# Patient Record
Sex: MALE | Race: WHITE | NOT HISPANIC OR LATINO | Employment: FULL TIME | URBAN - METROPOLITAN AREA
[De-identification: names, ages, dates, MRNs, and addresses within clinical notes are randomized per-mention and may not be internally consistent; named-entity substitution may affect disease eponyms.]

---

## 2020-11-23 ENCOUNTER — HOSPITAL ENCOUNTER (EMERGENCY)
Facility: HOSPITAL | Age: 50
Discharge: HOME | End: 2020-11-23
Attending: EMERGENCY MEDICINE
Payer: COMMERCIAL

## 2020-11-23 VITALS
SYSTOLIC BLOOD PRESSURE: 154 MMHG | WEIGHT: 315 LBS | BODY MASS INDEX: 42.66 KG/M2 | HEART RATE: 81 BPM | TEMPERATURE: 98 F | RESPIRATION RATE: 18 BRPM | DIASTOLIC BLOOD PRESSURE: 98 MMHG | OXYGEN SATURATION: 99 % | HEIGHT: 72 IN

## 2020-11-23 DIAGNOSIS — M25.572 ACUTE LEFT ANKLE PAIN: ICD-10-CM

## 2020-11-23 DIAGNOSIS — M10.9 ACUTE GOUT OF LEFT ANKLE, UNSPECIFIED CAUSE: Primary | ICD-10-CM

## 2020-11-23 LAB — URATE SERPL-MCNC: 5.4 MG/DL (ref 3.4–7)

## 2020-11-23 PROCEDURE — 25000003 PHARM REV CODE 250: Mod: ER | Performed by: EMERGENCY MEDICINE

## 2020-11-23 PROCEDURE — 63600175 PHARM REV CODE 636 W HCPCS: Mod: ER | Performed by: EMERGENCY MEDICINE

## 2020-11-23 PROCEDURE — 84550 ASSAY OF BLOOD/URIC ACID: CPT | Mod: ER

## 2020-11-23 PROCEDURE — 99284 EMERGENCY DEPT VISIT MOD MDM: CPT | Mod: 25,ER

## 2020-11-23 PROCEDURE — 96372 THER/PROPH/DIAG INJ SC/IM: CPT | Mod: ER

## 2020-11-23 RX ORDER — LOSARTAN POTASSIUM 100 MG/1
100 TABLET ORAL
COMMUNITY

## 2020-11-23 RX ORDER — INDOMETHACIN 50 MG/1
50 CAPSULE ORAL 3 TIMES DAILY PRN
Qty: 15 CAPSULE | Refills: 0 | Status: SHIPPED | OUTPATIENT
Start: 2020-11-23 | End: 2020-11-30

## 2020-11-23 RX ORDER — BETAMETHASONE SODIUM PHOSPHATE AND BETAMETHASONE ACETATE 3; 3 MG/ML; MG/ML
6 INJECTION, SUSPENSION INTRA-ARTICULAR; INTRALESIONAL; INTRAMUSCULAR; SOFT TISSUE
Status: COMPLETED | OUTPATIENT
Start: 2020-11-23 | End: 2020-11-23

## 2020-11-23 RX ORDER — PREDNISONE 10 MG/1
10 TABLET ORAL DAILY
Qty: 21 TABLET | Refills: 0 | Status: SHIPPED | OUTPATIENT
Start: 2020-11-23

## 2020-11-23 RX ORDER — IBUPROFEN 200 MG
600 TABLET ORAL
Status: COMPLETED | OUTPATIENT
Start: 2020-11-23 | End: 2020-11-23

## 2020-11-23 RX ADMIN — BETAMETHASONE SODIUM PHOSPHATE AND BETAMETHASONE ACETATE 6 MG: 3; 3 INJECTION, SUSPENSION INTRA-ARTICULAR; INTRALESIONAL; INTRAMUSCULAR at 06:11

## 2020-11-23 RX ADMIN — IBUPROFEN 600 MG: 200 TABLET, FILM COATED ORAL at 05:11

## 2020-11-23 NOTE — Clinical Note
"Wei Murphy" Jeff was seen and treated in our emergency department on 11/23/2020.  He may return to work on 11/26/2020.       If you have any questions or concerns, please don't hesitate to call.      Glen Patel RN RN    "

## 2020-11-23 NOTE — ED NOTES
Pt c/o left ankle and foot pain since yesterday afternoon, pain 10/10 throbbing and constant, is painful to bear weight. Pt states no incident occurred to cause injury.      LOC: The patient is awake, alert and aware of environment with an appropriate affect, the patient is oriented x 3 and speaking appropriately.  APPEARANCE: Patient resting comfortably and in no acute distress, patient is clean and well groomed, patient's clothing is properly fastened.  SKIN: The skin is warm and dry, color consistent with ethnicity, patient has normal skin turgor and moist mucus membranes, skin intact, no breakdown or bruising noted.  MUSCULOSKELETAL: Patient moving all extremities well, no obvious swelling or deformities noted.  RESPIRATORY: Airway is open and patent, respirations are spontaneous, patient has a normal effort and rate, no accessory muscle use noted.  CARDIAC: Patient has a normal rate and rhythm, no peripheral edema noted, capillary refill < 3 seconds.  ABDOMEN: Soft and non tender to palpation, no distention noted.  NEUROLOGIC: PERRL, eyes open spontaneously, behavior appropriate to situation, follows commands, facial expression symmetrical, bilateral hand grasp equal and even, purposeful motor response noted, normal sensation in all extremities when touched with a finger.

## 2020-11-23 NOTE — ED NOTES
Pt given pain medication, VSS, Resp e/u. Stretcher locked in lowest position. Side rails up x2. Call bell within reach. Will continue to monitor.       Radiology at bedside.

## 2020-11-23 NOTE — ED PROVIDER NOTES
Encounter Date: 11/23/2020       History     Chief Complaint   Patient presents with    Ankle Pain     Left ankle and foot pain, no inciddent occurred. Started noon yesterday, hardly able to bear weight.     Chief complaint:  Left ankle pain    History of present; 50-year-old male with complaints of left lateral ankle pain.  Onset acutely yesterday at noon.  He denies any specific trauma to the ankle.  There was an abrupt onset of throbbing discomfort.  10/10 at worst.  States pain radiates down the proximal aspect of the left foot.  No complaints of redness.  Does complain of swelling.  Pain aggravated by weight-bearing significantly.  Alleviated with rest and elevation.  Denies any fever or chills.  No complaints of arthropathy.  Patient with a history of renal cancer with subsequent nephrectomy.  Do for PET scan in next month.        Review of patient's allergies indicates:  No Known Allergies  History reviewed. No pertinent past medical history.  Past Surgical History:   Procedure Laterality Date    KNEE SURGERY Bilateral     NEPHRECTOMY       History reviewed. No pertinent family history.  Social History     Tobacco Use    Smoking status: Current Some Day Smoker     Years: 20.00     Types: Cigarettes    Smokeless tobacco: Never Used   Substance Use Topics    Alcohol use: Never     Frequency: Never    Drug use: Never     Review of Systems   Constitutional: Negative.  Negative for activity change and fever.   HENT: Negative.    Eyes: Negative.    Respiratory: Negative.    Cardiovascular: Negative.    Gastrointestinal: Negative for nausea and vomiting.   Genitourinary: Negative.  Negative for flank pain.   Musculoskeletal: Positive for arthralgias (left ankle), gait problem (Secondary to ankle pain) and joint swelling (Lateral aspect left ankle). Negative for back pain, myalgias, neck pain and neck stiffness.   Neurological: Negative for dizziness, weakness and numbness.   Hematological: Does not  bruise/bleed easily.   Psychiatric/Behavioral: Negative.    All other systems reviewed and are negative.      Physical Exam     Initial Vitals [11/23/20 0530]   BP Pulse Resp Temp SpO2   (!) 154/98 81 18 97.9 °F (36.6 °C) 99 %      MAP       --         Physical Exam    Nursing note and vitals reviewed.  Constitutional: He appears well-nourished. No distress.   HENT:   Head: Normocephalic and atraumatic.   Right Ear: External ear normal.   Left Ear: External ear normal.   Nose: Nose normal.   Mouth/Throat: Oropharynx is clear and moist.   Eyes: Conjunctivae and EOM are normal. Pupils are equal, round, and reactive to light.   Neck: Normal range of motion. Neck supple.   Cardiovascular: Normal rate, regular rhythm and intact distal pulses.   Pulmonary/Chest: No respiratory distress.   Abdominal: He exhibits no distension. There is no abdominal tenderness.   Musculoskeletal:        Left ankle: He exhibits swelling (Lateral malleolus). He exhibits normal range of motion, no ecchymosis, no deformity, no laceration and normal pulse. Tenderness. Lateral malleolus tenderness found. Achilles tendon exhibits no pain and no defect.        Feet:    Neurological: He is alert and oriented to person, place, and time. He has normal strength. GCS score is 15. GCS eye subscore is 4. GCS verbal subscore is 5. GCS motor subscore is 6.   Skin: Skin is warm and dry.   Psychiatric: He has a normal mood and affect. His behavior is normal. Judgment and thought content normal.         ED Course   Procedures  Labs Reviewed   URIC ACID          Imaging Results          X-Ray Ankle Complete Left (Final result)  Result time 11/23/20 07:23:35    Final result by Sathish Cortez MD (11/23/20 07:23:35)                 Impression:      No acute radiographic abnormality of the left ankle.    Chronic findings as above.      Electronically signed by: Sathish Cortez  Date:    11/23/2020  Time:    07:23             Narrative:    EXAMINATION:  XR ANKLE  COMPLETE 3 VIEW LEFT    CLINICAL HISTORY:  Pain in left ankle and joints of left foot    TECHNIQUE:  AP, lateral and oblique views of the left ankle were performed.    COMPARISON:  None    FINDINGS:  No acute fracture.  Joint alignment is intact.  No osteochondral defect.  Tiny ossification inferior to the medial malleolus may reflect chronic or remote ligamentous injury.  There are callus insertion and plantar calcaneal enthesophytes.  Ossific spurring noted of the tarsal Dontae's compatible with degenerative change.  No radiopaque foreign body.                                 Medical Decision Making:   ED Management:  Discussed plan plan of care with Dr. Romero at 6:00 a.m. awaiting a radiologic evaluation and serum uric acid.                 On re-eval, patient's pain is improving.  Discharging with gout treatment and f/u / return precautions.     Recent Results (from the past 24 hour(s))   Uric acid    Collection Time: 11/23/20  6:03 AM   Result Value Ref Range    Uric Acid 5.4 3.4 - 7.0 mg/dL       Medications   betamethasone acetate-betamethasone sodium phosphate injection 6 mg (6 mg Intramuscular Given 11/23/20 0605)   ibuprofen tablet 600 mg (600 mg Oral Given 11/23/20 0550)        I discussed with patient and/or family/caretaker that evaluation in the ED does not suggest any emergent or life threatening medical conditions requiring immediate intervention beyond what was provided in the ED, and I believe patient is safe for discharge.  Regardless, an unremarkable evaluation in the ED does not preclude the development or presence of a serious of life threatening condition. As such, patient was instructed to return immediately for any worsening or change in current symptoms.              Clinical Impression:     ICD-10-CM ICD-9-CM   1. Acute gout of left ankle, unspecified cause  M10.9 274.01   2. Acute left ankle pain  M25.572 719.47                          ED Disposition Condition    Discharge Stable         ED Prescriptions     Medication Sig Dispense Start Date End Date Auth. Provider    indomethacin (INDOCIN) 50 MG capsule Take 1 capsule (50 mg total) by mouth 3 (three) times daily as needed (pain). 15 capsule 11/23/2020 11/30/2020 Pete Romero MD    predniSONE (DELTASONE) 10 MG tablet Take 1 tablet (10 mg total) by mouth once daily. Take 4 tabs x 3 days, then  Take 2 tabs x 3 days, then   Take 1 tab x 3 days. 21 tablet 11/23/2020  Pete Romero MD        Follow-up Information     Follow up With Specialties Details Why Contact Info    with your pcp  In 2 weeks      [facility]  As needed, If symptoms worsen [id number]  [facility] [id number] [phone]                                       Pete Romero MD  11/23/20 5229